# Patient Record
Sex: FEMALE | Race: WHITE | NOT HISPANIC OR LATINO | Employment: FULL TIME | URBAN - METROPOLITAN AREA
[De-identification: names, ages, dates, MRNs, and addresses within clinical notes are randomized per-mention and may not be internally consistent; named-entity substitution may affect disease eponyms.]

---
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
## 2022-03-28 ENCOUNTER — HOSPITAL ENCOUNTER (EMERGENCY)
Facility: MEDICAL CENTER | Age: 22
End: 2022-03-28
Attending: EMERGENCY MEDICINE
Payer: COMMERCIAL

## 2022-03-28 VITALS
HEIGHT: 60 IN | HEART RATE: 87 BPM | DIASTOLIC BLOOD PRESSURE: 75 MMHG | OXYGEN SATURATION: 100 % | SYSTOLIC BLOOD PRESSURE: 110 MMHG | TEMPERATURE: 97 F | BODY MASS INDEX: 20.26 KG/M2 | WEIGHT: 103.17 LBS | RESPIRATION RATE: 16 BRPM

## 2022-03-28 DIAGNOSIS — R10.13 ABDOMINAL PAIN, ACUTE, EPIGASTRIC: ICD-10-CM

## 2022-03-28 DIAGNOSIS — E86.0 DEHYDRATION: ICD-10-CM

## 2022-03-28 DIAGNOSIS — R11.2 NAUSEA AND VOMITING IN ADULT: ICD-10-CM

## 2022-03-28 LAB
ALBUMIN SERPL BCP-MCNC: 4.6 G/DL (ref 3.2–4.9)
ALBUMIN/GLOB SERPL: 1.4 G/DL
ALP SERPL-CCNC: 72 U/L (ref 30–99)
ALT SERPL-CCNC: 12 U/L (ref 2–50)
ANION GAP SERPL CALC-SCNC: 15 MMOL/L (ref 7–16)
AST SERPL-CCNC: 19 U/L (ref 12–45)
B-OH-BUTYR SERPL-MCNC: 0.52 MMOL/L (ref 0.02–0.27)
BASE EXCESS BLDV CALC-SCNC: -5 MMOL/L
BASOPHILS # BLD AUTO: 0.4 % (ref 0–1.8)
BASOPHILS # BLD: 0.09 K/UL (ref 0–0.12)
BILIRUB SERPL-MCNC: 0.6 MG/DL (ref 0.1–1.5)
BODY TEMPERATURE: 37 CENTIGRADE
BUN SERPL-MCNC: 11 MG/DL (ref 8–22)
CALCIUM SERPL-MCNC: 9.3 MG/DL (ref 8.4–10.2)
CHLORIDE SERPL-SCNC: 102 MMOL/L (ref 96–112)
CO2 SERPL-SCNC: 20 MMOL/L (ref 20–33)
CREAT SERPL-MCNC: 0.63 MG/DL (ref 0.5–1.4)
EOSINOPHIL # BLD AUTO: 0.06 K/UL (ref 0–0.51)
EOSINOPHIL NFR BLD: 0.3 % (ref 0–6.9)
ERYTHROCYTE [DISTWIDTH] IN BLOOD BY AUTOMATED COUNT: 39.6 FL (ref 35.9–50)
GFR SERPLBLD CREATININE-BSD FMLA CKD-EPI: 130 ML/MIN/1.73 M 2
GLOBULIN SER CALC-MCNC: 3.2 G/DL (ref 1.9–3.5)
GLUCOSE BLD STRIP.AUTO-MCNC: 156 MG/DL (ref 65–99)
GLUCOSE SERPL-MCNC: 172 MG/DL (ref 65–99)
HCG SERPL QL: NEGATIVE
HCO3 BLDV-SCNC: 21 MMOL/L (ref 24–28)
HCT VFR BLD AUTO: 40.7 % (ref 37–47)
HGB BLD-MCNC: 13.7 G/DL (ref 12–16)
IMM GRANULOCYTES # BLD AUTO: 0.14 K/UL (ref 0–0.11)
IMM GRANULOCYTES NFR BLD AUTO: 0.7 % (ref 0–0.9)
LACTATE BLD-SCNC: 2 MMOL/L (ref 0.5–2)
LIPASE SERPL-CCNC: 16 U/L (ref 7–58)
LYMPHOCYTES # BLD AUTO: 0.58 K/UL (ref 1–4.8)
LYMPHOCYTES NFR BLD: 2.9 % (ref 22–41)
MCH RBC QN AUTO: 30.6 PG (ref 27–33)
MCHC RBC AUTO-ENTMCNC: 33.7 G/DL (ref 33.6–35)
MCV RBC AUTO: 91.1 FL (ref 81.4–97.8)
MONOCYTES # BLD AUTO: 1.48 K/UL (ref 0–0.85)
MONOCYTES NFR BLD AUTO: 7.3 % (ref 0–13.4)
NEUTROPHILS # BLD AUTO: 17.96 K/UL (ref 2–7.15)
NEUTROPHILS NFR BLD: 88.4 % (ref 44–72)
NRBC # BLD AUTO: 0 K/UL
NRBC BLD-RTO: 0 /100 WBC
PCO2 BLDV: 41.9 MMHG (ref 41–51)
PH BLDV: 7.32 [PH] (ref 7.31–7.45)
PLATELET # BLD AUTO: 351 K/UL (ref 164–446)
PMV BLD AUTO: 9.4 FL (ref 9–12.9)
PO2 BLDV: 60.2 MMHG (ref 25–40)
POTASSIUM SERPL-SCNC: 3.4 MMOL/L (ref 3.6–5.5)
PROT SERPL-MCNC: 7.8 G/DL (ref 6–8.2)
RBC # BLD AUTO: 4.47 M/UL (ref 4.2–5.4)
SAO2 % BLDV: 88.2 %
SODIUM SERPL-SCNC: 137 MMOL/L (ref 135–145)
WBC # BLD AUTO: 20.3 K/UL (ref 4.8–10.8)

## 2022-03-28 PROCEDURE — 99285 EMERGENCY DEPT VISIT HI MDM: CPT

## 2022-03-28 PROCEDURE — 96375 TX/PRO/DX INJ NEW DRUG ADDON: CPT

## 2022-03-28 PROCEDURE — 82803 BLOOD GASES ANY COMBINATION: CPT

## 2022-03-28 PROCEDURE — 85025 COMPLETE CBC W/AUTO DIFF WBC: CPT

## 2022-03-28 PROCEDURE — 84703 CHORIONIC GONADOTROPIN ASSAY: CPT

## 2022-03-28 PROCEDURE — 700111 HCHG RX REV CODE 636 W/ 250 OVERRIDE (IP): Performed by: EMERGENCY MEDICINE

## 2022-03-28 PROCEDURE — 83690 ASSAY OF LIPASE: CPT

## 2022-03-28 PROCEDURE — 83605 ASSAY OF LACTIC ACID: CPT

## 2022-03-28 PROCEDURE — 80053 COMPREHEN METABOLIC PANEL: CPT

## 2022-03-28 PROCEDURE — 82010 KETONE BODYS QUAN: CPT

## 2022-03-28 PROCEDURE — 96374 THER/PROPH/DIAG INJ IV PUSH: CPT

## 2022-03-28 PROCEDURE — 36415 COLL VENOUS BLD VENIPUNCTURE: CPT

## 2022-03-28 PROCEDURE — 82962 GLUCOSE BLOOD TEST: CPT

## 2022-03-28 PROCEDURE — 700105 HCHG RX REV CODE 258: Performed by: EMERGENCY MEDICINE

## 2022-03-28 RX ORDER — HALOPERIDOL 5 MG/ML
1 INJECTION INTRAMUSCULAR ONCE
Status: COMPLETED | OUTPATIENT
Start: 2022-03-28 | End: 2022-03-28

## 2022-03-28 RX ORDER — SODIUM CHLORIDE, SODIUM LACTATE, POTASSIUM CHLORIDE, CALCIUM CHLORIDE 600; 310; 30; 20 MG/100ML; MG/100ML; MG/100ML; MG/100ML
1000 INJECTION, SOLUTION INTRAVENOUS ONCE
Status: COMPLETED | OUTPATIENT
Start: 2022-03-28 | End: 2022-03-28

## 2022-03-28 RX ORDER — PROCHLORPERAZINE MALEATE 10 MG
10 TABLET ORAL EVERY 6 HOURS PRN
Qty: 10 TABLET | Refills: 3 | Status: SHIPPED | OUTPATIENT
Start: 2022-03-28 | End: 2022-04-22

## 2022-03-28 RX ORDER — DIPHENHYDRAMINE HYDROCHLORIDE 50 MG/ML
25 INJECTION INTRAMUSCULAR; INTRAVENOUS ONCE
Status: COMPLETED | OUTPATIENT
Start: 2022-03-28 | End: 2022-03-28

## 2022-03-28 RX ADMIN — DIPHENHYDRAMINE HYDROCHLORIDE 25 MG: 50 INJECTION INTRAMUSCULAR; INTRAVENOUS at 05:40

## 2022-03-28 RX ADMIN — SODIUM CHLORIDE, POTASSIUM CHLORIDE, SODIUM LACTATE AND CALCIUM CHLORIDE 1000 ML: 600; 310; 30; 20 INJECTION, SOLUTION INTRAVENOUS at 05:41

## 2022-03-28 RX ADMIN — HALOPERIDOL LACTATE 1 MG: 5 INJECTION, SOLUTION INTRAMUSCULAR at 05:40

## 2022-03-28 NOTE — ED NOTES
Patient is stable for discharge at this time, anticipatory guidance provided, close follow-up is encouraged, and ED return instructions have been detailed.

## 2022-03-28 NOTE — ED PROVIDER NOTES
ED Provider Note    ED Provider Note    Scribed for Giselle Infante MD by Giselle Infante M.D.. 3/28/2022, 5:31 AM.    Primary care provider: Valentina Penaloza M.D.  Means of arrival: Private  History obtained from: Patient  History limited by: None    CHIEF COMPLAINT  Chief Complaint   Patient presents with   • N/V       HPI  Roxy Encarnacion is a 21 y.o. female who presents to the Emergency Department for evaluation of epigastric abdominal pain as well as nausea and vomiting.  Patient notes this started last night.  She has a history of monogenic diabetes.  Notes ill contacts with similar symptoms though there is has resolved and she is continued vomiting every few minutes throughout the night.  Patient otherwise not really had any similar such symptoms.  No diarrhea, no fever but endorses chills.  Pain is sharp and severe localized to the epigastrium without radiation elsewhere.  Patient relates she vapes THC on a daily basis, no significant alcohol use.  No lower abdominal/pelvic pain.  She took oral Zofran at home with no improvement    REVIEW OF SYSTEMS  Pertinent positives include acute epigastric abdominal pain with repeated episodes of nausea and vomiting, associated chills. Pertinent negatives include no pelvic pain, no fever, no hematemesis, no diarrhea.  All other systems reviewed and negative.    PAST MEDICAL HISTORY   has a past medical history of MRSA (methicillin resistant Staphylococcus aureus) and Sinusitis acute (4/7/2010).    SURGICAL HISTORY   has a past surgical history that includes tumor excision with biopsy (left index).    SOCIAL HISTORY  Social History     Tobacco Use   • Smoking status: Never Smoker   • Smokeless tobacco: Never Used   Vaping Use   • Vaping Use: Every day   • Substances: Nicotine, THC   Substance Use Topics   • Alcohol use: No   • Drug use: Yes     Comment: marijuana      Social History     Substance and Sexual Activity   Drug Use Yes    Comment: marijuana        FAMILY HISTORY  Family History   Problem Relation Age of Onset   • Alcohol/Drug Mother    Mature onset diabetes of the young    CURRENT MEDICATIONS  Home Medications    **Home medications have not yet been reviewed for this encounter**         ALLERGIES  Allergies   Allergen Reactions   • Nkda [No Known Drug Allergy]        PHYSICAL EXAM  VITAL SIGNS: /74   Pulse 78   Temp (!) 35.7 °C (96.3 °F) (Temporal)   Resp 18   Ht 1.524 m (5')   Wt 46.8 kg (103 lb 2.8 oz)   LMP  (Approximate)   SpO2 99%   Breastfeeding No   BMI 20.15 kg/m²     General: Alert, mild acute distress  Skin: Warm, dry, pale, otherwise normal for ethnicity  Head: Normocephalic, atraumatic  Neck: Trachea midline, no tenderness  Eye: Sclera anicteric, normal conjunctiva  ENMT: Oral mucosa pink and dry, no pharyngeal erythema or exudate  Cardiovascular: S1, S2, mildly tachycardic, otherwise regular rate and rhythm, No murmur, Normal peripheral perfusion  Respiratory: Lungs CTA, respirations are non-labored, breath sounds are equal  Gastrointestinal: Soft, no lower quadrant tenderness, tenderness localized the epigastrium.  No guarding, no rebound, no rigidity.  Bowel sounds hypoactive, abdomen nondistended.  Musculoskeletal: No swelling, no deformity  Neurological: Alert and oriented to person, place, time, and situation  Psychiatric: Cooperative, appropriate mood & affect      DIAGNOSTIC STUDIES/PROCEDURES    LABS  Results for orders placed or performed during the hospital encounter of 03/28/22   CBC WITH DIFFERENTIAL   Result Value Ref Range    WBC 20.3 (H) 4.8 - 10.8 K/uL    RBC 4.47 4.20 - 5.40 M/uL    Hemoglobin 13.7 12.0 - 16.0 g/dL    Hematocrit 40.7 37.0 - 47.0 %    MCV 91.1 81.4 - 97.8 fL    MCH 30.6 27.0 - 33.0 pg    MCHC 33.7 33.6 - 35.0 g/dL    RDW 39.6 35.9 - 50.0 fL    Platelet Count 351 164 - 446 K/uL    MPV 9.4 9.0 - 12.9 fL    Neutrophils-Polys 88.40 (H) 44.00 - 72.00 %    Lymphocytes 2.90 (L) 22.00 - 41.00 %     Monocytes 7.30 0.00 - 13.40 %    Eosinophils 0.30 0.00 - 6.90 %    Basophils 0.40 0.00 - 1.80 %    Immature Granulocytes 0.70 0.00 - 0.90 %    Nucleated RBC 0.00 /100 WBC    Neutrophils (Absolute) 17.96 (H) 2.00 - 7.15 K/uL    Lymphs (Absolute) 0.58 (L) 1.00 - 4.80 K/uL    Monos (Absolute) 1.48 (H) 0.00 - 0.85 K/uL    Eos (Absolute) 0.06 0.00 - 0.51 K/uL    Baso (Absolute) 0.09 0.00 - 0.12 K/uL    Immature Granulocytes (abs) 0.14 (H) 0.00 - 0.11 K/uL    NRBC (Absolute) 0.00 K/uL   COMP METABOLIC PANEL   Result Value Ref Range    Sodium 137 135 - 145 mmol/L    Potassium 3.4 (L) 3.6 - 5.5 mmol/L    Chloride 102 96 - 112 mmol/L    Co2 20 20 - 33 mmol/L    Anion Gap 15.0 7.0 - 16.0    Glucose 172 (H) 65 - 99 mg/dL    Bun 11 8 - 22 mg/dL    Creatinine 0.63 0.50 - 1.40 mg/dL    Calcium 9.3 8.4 - 10.2 mg/dL    AST(SGOT) 19 12 - 45 U/L    ALT(SGPT) 12 2 - 50 U/L    Alkaline Phosphatase 72 30 - 99 U/L    Total Bilirubin 0.6 0.1 - 1.5 mg/dL    Albumin 4.6 3.2 - 4.9 g/dL    Total Protein 7.8 6.0 - 8.2 g/dL    Globulin 3.2 1.9 - 3.5 g/dL    A-G Ratio 1.4 g/dL   LIPASE   Result Value Ref Range    Lipase 16 7 - 58 U/L   LACTIC ACID   Result Value Ref Range    Lactic Acid 2.0 0.5 - 2.0 mmol/L   HCG Qual Serum   Result Value Ref Range    Beta-Hcg Qualitative Serum Negative Negative   VENOUS BLOOD GAS   Result Value Ref Range    Venous Bg Ph 7.32 7.31 - 7.45    Venous Bg Pco2 41.9 41.0 - 51.0 mmHg    Venous Bg Po2 60.2 (H) 25.0 - 40.0 mmHg    Venous Bg O2 Saturation 88.2 %    Venous Bg Hco3 21 (L) 24 - 28 mmol/L    Venous Bg Base Excess -5 mmol/L    Body Temp 37.0 Centigrade   ESTIMATED GFR   Result Value Ref Range    GFR (CKD-EPI) 130 >60 mL/min/1.73 m 2   POCT glucose device results   Result Value Ref Range    POC Glucose, Blood 156 (H) 65 - 99 mg/dL     All labs reviewed by me, significant leukocytosis with neutrophil predominance.      COURSE & MEDICAL DECISION MAKING  Pertinent Labs & Imaging studies reviewed. (See chart for  details)    5:31 AM - Patient seen and examined at bedside. Patient will be treated with haloperidol 1 mg IV, diphenhydramine 25 mg IV, 1 L of crystalloid. Ordered blood work-up as well as venous blood gases and beta hydroxybutyrate to evaluate her symptoms. The differential diagnoses include but are not limited to: Diabetic ketoacidosis, cannabis hyperemesis syndrome, gastroenteritis, gastritis, pancreatitis, dehydration, electrolyte abnormality    0550: Blood gas is reassuring, low normal pH not in acidotic range.  Patient is only mildly hyperglycemic per Accu-Chek, 155.  Chemistries are still pending but this would make diabetic ketoacidosis far less likely.    0631: Patient reassessed, she is feeling much better, nausea and pain essentially resolved.  Her skin tone is improved with IV fluids.  I have updated her with thankfully unremarkable work-up other than evidence of leukocytosis.    Patient Vitals for the past 24 hrs:   BP Temp Temp src Pulse Resp SpO2 Height Weight   03/28/22 0616 115/74 -- -- 78 18 99 % -- --   03/28/22 0508 137/93 (!) 35.7 °C (96.3 °F) Temporal 100 18 96 % 1.524 m (5') 46.8 kg (103 lb 2.8 oz)       HYDRATION: Based on the patient's presentation of Acute Vomiting, Dehydration and Tachycardia the patient was given IV fluids. IV Hydration was used because oral hydration was not adequate alone. Upon recheck following hydration, the patient was Doing better, tachycardia resolved, heart rate 78.    Decision Making:      This is a 21 y.o. year old female who presents with upper abdominal pain with nausea and vomiting since last night.  Patient relates ill contacts at home with similar symptoms but hers have been quite persistent.  Patient notes she can only go a few minutes been vomiting and cannot tolerate any oral intake.  Clear indication for IV fluid resuscitation.  Patient retches during the history as well, vomitus is consistent with stomach acid and is nonbilious.  She is afebrile but is  tachycardic.  Given there are ill contacts around the home certainly viral gastroenteritis is high on the differential, in addition given history of monogenic diabetes I do think is reasonable to obtain metabolic work-up to evaluate for potential diabetic ketoacidosis.  In addition, patient does use daily THC, cannabis hyperemesis syndrome must be considered as well.  Given no better with Zofran I have ordered Haldol as well as diphenhydramine.        Work-up demonstrates leukocytosis but otherwise unremarkable studies.  Tadeo appendicitis score is 4, unlikely to represent acute appendicitis.  She is feeling markedly better without any narcotics, leukocytosis likely secondary to demargination and volume depletion; certainly viral gastroenteritis is a significant consideration as well.  Given no better with Zofran I will write for Compazine for home as needed.     The patient will return for new or worsening symptoms and is stable at the time of discharge.    Patient has had high blood pressure while in the emergency department, felt likely secondary to medical condition. Counseled patient to monitor blood pressure at home and follow up with primary care physician.     DISPOSITION:  Patient will be discharged home in stable condition.    FOLLOW UP:  Valentina Penaloza M.D.  [address] [place] [id number]  [phone]    Schedule an appointment as soon as possible for a visit in 1 day        OUTPATIENT MEDICATIONS:  New Prescriptions    PROCHLORPERAZINE (COMPAZINE) 10 MG TAB    Take 1 Tablet by mouth every 6 hours as needed for Nausea/Vomiting.         FINAL IMPRESSION  1. Dehydration    2. Nausea and vomiting in adult    3. Abdominal pain, acute, epigastric          IGiselle M.D. (Kristen), am scribing for, and in the presence of, Giselle Infante MD.    Electronically signed by: Giselle Infante M.D. (Kristen), 3/28/2022    IGiselle MD personally performed  the services described in this documentation, as scribed by Giselle Infante M.D. in my presence, and it is both accurate and complete    The note accurately reflects work and decisions made by me.  Giselle Infante M.D.  3/28/2022  5:51 AM

## 2022-04-21 ENCOUNTER — APPOINTMENT (OUTPATIENT)
Dept: MEDICAL GROUP | Age: 22
End: 2022-04-21
Payer: COMMERCIAL

## 2022-04-22 ENCOUNTER — OFFICE VISIT (OUTPATIENT)
Dept: MEDICAL GROUP | Age: 22
End: 2022-04-22
Payer: COMMERCIAL

## 2022-04-22 VITALS
SYSTOLIC BLOOD PRESSURE: 96 MMHG | HEIGHT: 60 IN | TEMPERATURE: 98.2 F | RESPIRATION RATE: 15 BRPM | HEART RATE: 100 BPM | BODY MASS INDEX: 20.42 KG/M2 | DIASTOLIC BLOOD PRESSURE: 60 MMHG | OXYGEN SATURATION: 97 % | WEIGHT: 104 LBS

## 2022-04-22 DIAGNOSIS — F41.9 ANXIETY: ICD-10-CM

## 2022-04-22 DIAGNOSIS — Z76.89 ESTABLISHING CARE WITH NEW DOCTOR, ENCOUNTER FOR: ICD-10-CM

## 2022-04-22 DIAGNOSIS — E13.00: ICD-10-CM

## 2022-04-22 DIAGNOSIS — R73.09 ELEVATED HEMOGLOBIN A1C: ICD-10-CM

## 2022-04-22 PROBLEM — R63.4 WEIGHT LOSS: Status: RESOLVED | Noted: 2018-11-20 | Resolved: 2022-04-22

## 2022-04-22 PROBLEM — R53.83 OTHER FATIGUE: Status: RESOLVED | Noted: 2018-11-20 | Resolved: 2022-04-22

## 2022-04-22 PROBLEM — R10.13 EPIGASTRIC PAIN: Status: RESOLVED | Noted: 2018-11-01 | Resolved: 2022-04-22

## 2022-04-22 PROBLEM — R42 DIZZINESS: Status: RESOLVED | Noted: 2020-05-28 | Resolved: 2022-04-22

## 2022-04-22 PROCEDURE — 99203 OFFICE O/P NEW LOW 30 MIN: CPT | Performed by: PHYSICIAN ASSISTANT

## 2022-04-22 RX ORDER — NORETHINDRONE ACETATE AND ETHINYL ESTRADIOL 1MG-20(21)
KIT ORAL
COMMUNITY
Start: 2022-04-18

## 2022-04-22 ASSESSMENT — FIBROSIS 4 INDEX: FIB4 SCORE: 0.31

## 2022-04-22 ASSESSMENT — PATIENT HEALTH QUESTIONNAIRE - PHQ9: CLINICAL INTERPRETATION OF PHQ2 SCORE: 0

## 2022-04-22 NOTE — LETTER
To whom it may concern     Roxy is my patient and is under my care. I am intimately aware of her medical history and functional restrictions brought by her mental condition. She meets the definition of disabled under the Americans with Disabilities Act, the Rehabilitation Act of 1973, and the Fair Housing Act.      As a result of mental illness,Roxy has certain limitations related to (anxiety, depression, social interaction, phobias, etc.). In order to assist in alleviating these difficulties, and to improve their ability to lead a better life while fully enjoying and using the dwelling unit you own and/or manage, I am prescribing an DMITRIY prescription letter that will help Roxy in dealing with his/her disability better.      I am very much aware of the voluminous professional literature related to the therapeutic benefits of emotional support animals for individuals with mental disabilities, such as that faced by Roxy. If you have any questions  regarding my recommendation that Roxy should have an emotional support animal please do not hesitate to get in touch with me.       Sincerely,     Hayley Crow P.A.-C.  Electronically Signed

## 2022-04-22 NOTE — PROGRESS NOTES
cc: Establish care, emotional support animal letter    Subjective:     HPI  Roxy Encarnacion is a 22 y.o. female presenting with her mom to establish [facility].  It appears that she has been seen by primary care provider.  She is going to school up in [place], she does come back for her medical care.  She would like to go into speech the pathology.    She has prior history of Deon type I.  She was previously seen by pediatric endocrinology, was never started on medications.  She believes last A1c-6.7, this was almost 2 years ago.  She does not check her blood sugars.  She is asymptomatic.  Denies polyuria, polydipsia, polyphagia, fatigue.    She also has mild anxiety.  She feels stable without medications.  She does have a cat, which  provides good emotional support.  She will be moving into a new apartment and will need an emotional support animal letter.  Denies depression, SI, HI.    Review of systems:  See above.       Current Outpatient Medications:   •  Blood Glucose Monitoring Suppl (TRUE METRIX METER) Device, 1 Device by Does not apply route 3 times a day., Disp: 1 Device, Rfl: 11  •  glucose blood strip, 1 Each by Other route as needed., Disp: , Rfl:   •  Ketone Blood Test (PRECISION XTRA) Strip, Check ketones prn BS >200 with vomiting., Disp: 10 Strip, Rfl: 11  •  NON SPECIFIED, Take 1 Tablet by mouth every day. Unknown BCP, Disp: , Rfl:   •  BLISOVI FE 1/20 1-20 MG-MCG per tablet, , Disp: , Rfl:     Allergies, past medical history, past surgical history, family history, social history reviewed and updated    Objective:     Vitals: BP (!) 96/60 (BP Location: Left arm, Patient Position: Sitting, BP Cuff Size: Adult)   Pulse 100   Temp 36.8 °C (98.2 °F) (Temporal)   Resp 15   Ht 1.524 m (5')   Wt 47.2 kg (104 lb)   SpO2 97%   BMI 20.31 kg/m²   General: Alert, pleasant, NAD  HEENT: Normocephalic. Neck supple.  No thyromegaly or masses palpated. No cervical or supraclavicular lymphadenopathy. No carotid bruits    Heart: Regular rate and rhythm.  S1 and S2 normal.  No murmurs appreciated.  Respiratory: Normal respiratory effort.  Clear to auscultation bilaterally.  Skin: Warm, dry, no rashes.  Extremities: No leg edema.  Radial pulses 2+ symmetric  Psych:  Affect/mood is normal, judgement is good, memory is intact, grooming is appropriate.    Assessment/Plan:     Roxy was seen today for establish care and paperwork.    Diagnoses and all orders for this visit:    TAWNY 1 with hyperosmolarity, controlled (HCC)  -Has been almost 2 years since she has had labs checked.  Also place referral to endocrinology for further evaluation if deemed appropriate.  -     Referral to Endocrinology    Elevated hemoglobin A1c  -     Comp Metabolic Panel; Future  -     HEMOGLOBIN A1C; Future    Anxiety  -Stable without medications.  Emotional support letter given    Establishing care with new doctor, encounter for  -We will request records and review when received      Return in about 3 months (around 7/22/2022) for Annual PX.

## 2022-04-26 ENCOUNTER — HOSPITAL ENCOUNTER (OUTPATIENT)
Dept: LAB | Facility: MEDICAL CENTER | Age: 22
End: 2022-04-26
Attending: PHYSICIAN ASSISTANT
Payer: COMMERCIAL

## 2022-04-26 DIAGNOSIS — R73.09 ELEVATED HEMOGLOBIN A1C: ICD-10-CM

## 2022-04-26 LAB
ALBUMIN SERPL BCP-MCNC: 4.9 G/DL (ref 3.2–4.9)
ALBUMIN/GLOB SERPL: 1.6 G/DL
ALP SERPL-CCNC: 75 U/L (ref 30–99)
ALT SERPL-CCNC: 13 U/L (ref 2–50)
ANION GAP SERPL CALC-SCNC: 16 MMOL/L (ref 7–16)
AST SERPL-CCNC: 16 U/L (ref 12–45)
BILIRUB SERPL-MCNC: 0.6 MG/DL (ref 0.1–1.5)
BUN SERPL-MCNC: 10 MG/DL (ref 8–22)
CALCIUM SERPL-MCNC: 9.8 MG/DL (ref 8.5–10.5)
CHLORIDE SERPL-SCNC: 100 MMOL/L (ref 96–112)
CO2 SERPL-SCNC: 24 MMOL/L (ref 20–33)
CREAT SERPL-MCNC: 0.73 MG/DL (ref 0.5–1.4)
EST. AVERAGE GLUCOSE BLD GHB EST-MCNC: 123 MG/DL
GFR SERPLBLD CREATININE-BSD FMLA CKD-EPI: 121 ML/MIN/1.73 M 2
GLOBULIN SER CALC-MCNC: 3.1 G/DL (ref 1.9–3.5)
GLUCOSE SERPL-MCNC: 113 MG/DL (ref 65–99)
HBA1C MFR BLD: 5.9 % (ref 4–5.6)
POTASSIUM SERPL-SCNC: 4.2 MMOL/L (ref 3.6–5.5)
PROT SERPL-MCNC: 8 G/DL (ref 6–8.2)
SODIUM SERPL-SCNC: 140 MMOL/L (ref 135–145)

## 2022-04-26 PROCEDURE — 80053 COMPREHEN METABOLIC PANEL: CPT

## 2022-04-26 PROCEDURE — 36415 COLL VENOUS BLD VENIPUNCTURE: CPT

## 2022-04-26 PROCEDURE — 83036 HEMOGLOBIN GLYCOSYLATED A1C: CPT

## 2022-06-09 ENCOUNTER — OFFICE VISIT (OUTPATIENT)
Dept: MEDICAL GROUP | Age: 22
End: 2022-06-09
Payer: COMMERCIAL

## 2022-06-09 VITALS
HEIGHT: 60 IN | SYSTOLIC BLOOD PRESSURE: 106 MMHG | HEART RATE: 100 BPM | RESPIRATION RATE: 16 BRPM | WEIGHT: 111.6 LBS | BODY MASS INDEX: 21.91 KG/M2 | OXYGEN SATURATION: 98 % | TEMPERATURE: 98.6 F | DIASTOLIC BLOOD PRESSURE: 62 MMHG

## 2022-06-09 DIAGNOSIS — Z00.00 WELL ADULT EXAM: ICD-10-CM

## 2022-06-09 DIAGNOSIS — E13.00: ICD-10-CM

## 2022-06-09 PROCEDURE — 99395 PREV VISIT EST AGE 18-39: CPT | Performed by: PHYSICIAN ASSISTANT

## 2022-06-09 ASSESSMENT — FIBROSIS 4 INDEX: FIB4 SCORE: 0.25

## 2022-06-09 NOTE — PROGRESS NOTES
Subjective:     CC:   Chief Complaint   Patient presents with   • Annual Exam       HPI:   Roxy Encarnacion is a 22 y.o. female who presents for annual exam    Patient has GYN provider: Yes  Last Pap Smear:n/a   Last Tdap: 2014  Received HPV series: Yes    Exercise: strenuous regular exercise, aerobic > 3 hours a week  Diet: well rounded      No LMP recorded.  Hx STDs: No  Birth control: ocp  Menses every month with 4 days with moderate bleeding.  Denies cramping.  No significant bloating/fluid retention, pelvic pain, or dyspareunia. No abnormal vaginal discharge.  No breast tenderness, mass, nipple discharge, changes in size or contour, or abnormal cyclic discomfort.     Latest Reference Range & Units 04/26/22 11:27   Sodium 135 - 145 mmol/L 140   Potassium 3.6 - 5.5 mmol/L 4.2   Chloride 96 - 112 mmol/L 100   Co2 20 - 33 mmol/L 24   Anion Gap 7.0 - 16.0  16.0   Glucose 65 - 99 mg/dL 113 (H)   Bun 8 - 22 mg/dL 10   Creatinine 0.50 - 1.40 mg/dL 0.73   GFR (CKD-EPI) >60 mL/min/1.73 m 2 121   Calcium 8.5 - 10.5 mg/dL 9.8   AST(SGOT) 12 - 45 U/L 16   ALT(SGPT) 2 - 50 U/L 13   Alkaline Phosphatase 30 - 99 U/L 75   Total Bilirubin 0.1 - 1.5 mg/dL 0.6   Albumin 3.2 - 4.9 g/dL 4.9   Total Protein 6.0 - 8.2 g/dL 8.0   Globulin 1.9 - 3.5 g/dL 3.1   A-G Ratio g/dL 1.6   Glycohemoglobin 4.0 - 5.6 % 5.9 (H)   Estim. Avg Glu mg/dL 123   (H): Data is abnormally high    OB History   No obstetric history on file.      She  has no history on file for sexual activity.    She  has a past medical history of MRSA (methicillin resistant Staphylococcus aureus) and Sinusitis acute (4/7/2010).  She  has a past surgical history that includes tumor excision with biopsy (left index) and shoulder arthroscopy w/ rotator cuff repair (Right, 2020).    Family History   Problem Relation Age of Onset   • Alcohol/Drug Mother    • Diabetes Mother    • No Known Problems Father    • Other Brother 8        SVT   • Diabetes Sister    • Anxiety disorder  Sister      Social History     Tobacco Use   • Smoking status: Never Smoker   • Smokeless tobacco: Never Used   Vaping Use   • Vaping Use: Every day   • Substances: Nicotine, THC   Substance Use Topics   • Alcohol use: No   • Drug use: Yes     Comment: marijuana       Patient Active Problem List    Diagnosis Date Noted   • TAWNY 1 with hyperosmolarity, controlled ([facility]) 04/22/2022   • Elevated hemoglobin A1c 11/20/2018   • Anxiety 11/05/2018     Current Outpatient Medications   Medication Sig Dispense Refill   • BLISOVI FE 1/20 1-20 MG-MCG per tablet      • Blood Glucose Monitoring Suppl (TRUE METRIX METER) Device 1 Device by Does not apply route 3 times a day. 1 Device 11   • glucose blood strip 1 Each by Other route as needed.     • Ketone Blood Test (PRECISION XTRA) Strip Check ketones prn BS >200 with vomiting. 10 Strip 11   • NON SPECIFIED Take 1 Tablet by mouth every day. Unknown BCP       No current facility-administered medications for this visit.     Allergies   Allergen Reactions   • Nkda [No Known Drug Allergy]        Review of Systems   Constitutional: Negative for fever, chills and malaise/fatigue.   HENT: Negative for congestion.    Eyes: Negative for pain.   Respiratory: Negative for cough and shortness of breath.    Cardiovascular: Negative for chest pain and leg swelling.   Gastrointestinal: Negative for nausea, vomiting, abdominal pain and diarrhea.   Genitourinary: Negative for dysuria and hematuria.   Skin: Negative for rash.   Neurological: Negative for dizziness, focal weakness and headaches.   Endo/Heme/Allergies: Does not bruise/bleed easily.   Psychiatric/Behavioral: Negative for depression.  The patient is not nervous/anxious.      Objective:   /62 (BP Location: Right arm, Patient Position: Sitting, BP Cuff Size: Adult)   Pulse 100   Temp 37 °C (98.6 °F) (Temporal)   Resp 16   Ht 1.524 m (5')   Wt 50.6 kg (111 lb 9.6 oz)   SpO2 98%   BMI 21.80 kg/m²     Wt Readings from Last 4  Encounters:   06/09/22 50.6 kg (111 lb 9.6 oz)   04/22/22 47.2 kg (104 lb) (7 %, Z= -1.49)*   03/28/22 46.8 kg (103 lb 2.8 oz) (6 %, Z= -1.55)*   12/01/20 43.1 kg (95 lb) (1 %, Z= -2.22)*     * Growth percentiles are based on [facility] (Girls, 2-20 Years) data.         Physical Exam:  Constitutional: Well-developed and well-nourished. Not diaphoretic. No distress.   Skin: Skin is warm and dry. No rash noted.  Head: Atraumatic without lesions.  Eyes: Conjunctivae and extraocular motions are normal. Pupils are equal, round, and reactive to light. No scleral icterus.   Ears:  External ears unremarkable. Tympanic membranes clear and intact.  Nose: Nares patent. Septum midline. Turbinates without erythema nor edema. No discharge.   Mouth/Throat: Tongue normal. Oropharynx is clear and moist. Posterior pharynx without erythema or exudates.  Neck: Supple, trachea midline. Normal range of motion. No thyromegaly present. No lymphadenopathy--cervical or supraclavicular.  Cardiovascular: Regular rate and rhythm, S1 and S2 without murmur, rubs, or gallops.    Respiratory: Effort normal. Clear to auscultation throughout. No adventitious sounds.   Abdomen: Soft, non tender, and without distention. Active bowel sounds in all four quadrants. No rebound, guarding, masses or HSM.  Extremities: No cyanosis, clubbing, erythema, nor edema. Distal pulses intact and symmetric.   Musculoskeletal: All major joints AROM full in all directions without pain.  Neurological: Alert and oriented x 3. Grossly non-focal. Strength and sensation grossly intact. DTRs 2+/3 and symmetric.   Psychiatric:  Behavior, mood, and affect are appropriate.    Assessment and Plan:     1. Well adult exam  Continue with regular physical activity and good control diet.  Follow-up with gynecology for regular screenings when she turns 23    2. TAWNY 1 with hyperosmolarity, controlled (HCC)  -A1c 5.9.  She has been referred to endocrine for further management if deemed  appropriate.  Advised to have labs redrawn in 6 months before her  endocrinology appointment.  - Comp Metabolic Panel; Future  - HEMOGLOBIN A1C; Future      Health maintenance:    Labs per orders  Immunizations per orders  Patient counseled about skin care, diet, supplements, and exercise.  Discussed  breast self exam, use and side effects of OCP's, diet and exercise     Follow-up: Return in about 1 year (around 6/9/2023) for Annual PX.

## 2023-12-18 NOTE — PROGRESS NOTES
Patient was presented for a telehealth consultation via secure and encrypted videoconferencing technology using [employer].  Parent/guardian and the patient consented to telemedicine.    Subjective:     HPI:     Roxy Encarnacion is a 20 y.o. female here today for follow up of presumed TAWNY, her sister reportedly has a mutation diagnosed in [place], [place].  We have been unable to get her insurance to cover the cost of TAWNY testing. Patient reports she was diagnosed with a bicornuate uterus and is followed by gynecology.       New since last visit: She has a torn rotator cuff and will need surgery.  Home on quarantine due to pandemic.  She is living with her grandfather but plans to move to [place] with her sister and grandma.     Patient was referred to our office by Dr. Penaloza.  Patient reports she became symptomatic in July of this year with abdominal pain, dizziness, nausea, fatigue, weakness, shaking.  She states due to ongoing issues with diarrhea and abdominal pain and she was referred to Dr. Butts.  She underwent an EGD in January 2019 that was normal.  Dr. Butts has ordered an MRI enterography study.  She does occasionally use marijuana.  She was counseled against using marijuana due to hyperemesis seen in some patients during its use.  She was also seen by her PCP for her abdominal pain.  She feels her abdominal pain has improved since she started eating the right foods.  Sometimes her ketones are high when her abdomen hurts.       She states that more recently she has developed polyuria, polydipsia and a 12 pound weight loss since over the past year.  Therefore, labs were done which showed an elevation of her hemoglobin A1c =6.7%.  Her CO2 was normal and her blood sugar is 108.     There is also family history of insulin-dependent diabetes on the mother's side.  Mother reportedly had gestational diabetes requiring with all 3 pregnancies.  However, when not pregnant she is not on any oral meds or  "insulin for her diabetes.  There is also reports of a maternal grandmother who is  secondary to diabetes, type unknown.  There are maternal relatives that have \"TAWNY\" and only take pills when their blood sugars are elevated.       She c/o of early satiety.  This only happens during the school year.  She can eat fine over breaks and summer.  She has a lot of anxiety about going to school.  It is more the social side of school that makes her anxious. This did not happen during the summer.  She is working 30 hour and going to school and playing a sport.  She feels the OCPs cause her to have abdominal pain.  She reports gyn is switching her to DepoProvera.    Review of: meter was not done, it is not working. Her meter has not been working.  She has not been checking her ketones.  There were a few times when she was in the shower and felt like she was going to pass out.  She had to get out of the shower and lie down.  She felt better after eating.  It can occur with or with eating prior to showering.  She gets dizzy when going from lying to standing.  No syncope.  She has not checked her blood sugars when dizzy.  Her weight is around 95 pounds.  She still has c/o abdominal pain.  She has not checked ketones with c/o abdominal pain.  She has ketones test strips at home.  She has not seen Dr Butts recently.  She suffers from significant constipation.  OCP were causing nausea and abd pain, she switched to Depo with some improvement.      She has not reached out to [facility] regarding testing for TAWNY.         10/22/2019 08:18   Glycohemoglobin 5.9 (A)      2020 10:00   Sodium 140   Potassium 4.0   Chloride 106   Co2 23   Anion Gap 11.0   Glucose 118 (H)   Bun 10   Creatinine 0.67   GFR If  >60   GFR If Non African American >60   Calcium 9.1   Fasting Status Fasting       ROS   + fatigue  + headaches since getting new contacts.  + numbness/tingling in hands and feet but has " "improved.  No abdominal pain, nausea, vomiting, constipation or diarrhea.   No fever  No cough  No shortness of breath.   No nocturia, polyuria, polydipsia  No sleep disturbance    Allergies   Allergen Reactions   • Nkda [No Known Drug Allergy]        Current medicines (including changes today)  Current Outpatient Medications   Medication Sig Dispense Refill   • medroxyPROGESTERone (DEPO-PROVERA) 150 MG/ML Suspension 150 mg by Intramuscular route Once.     • acetone, urine, test (KETOSTIX) strip Test as needed with nausea or vomiting 100 Strip 11   • ondansetron (ZOFRAN ODT) 4 MG TABLET DISPERSIBLE Take 1 Tab by mouth every 8 hours as needed. 8 Tab 0   • Blood Glucose Monitoring Suppl (TRUE METRIX METER) Device 1 Device by Does not apply route 3 times a day. (Patient not taking: Reported on 5/28/2020) 1 Device 11   • glucose blood (TRUE METRIX BLOOD GLUCOSE TEST) strip 1 Each by Other route as needed.     • Ketone Blood Test (PRECISION XTRA) Strip Check ketones prn BS >200 with vomiting. (Patient not taking: Reported on 9/27/2019) 10 Strip 11   • NON SPECIFIED Take 1 Tab by mouth every day. Unknown BCP       No current facility-administered medications for this visit.        Patient Active Problem List    Diagnosis Date Noted   • Dizziness 05/28/2020   • Elevated hemoglobin A1c 11/20/2018   • Weight loss 11/20/2018   • Other fatigue 11/20/2018   • Anxiety 11/05/2018   • Epigastric pain 11/01/2018   • Sinusitis, acute 04/07/2010       Past Medical History: 11/2018 elevated hemoglobin A1c =6.7% with weight loss, polyuria and polydipsia.  History of \"bone tumor\" (benign) requiring reconstructive surgery x3.  History of MRSA in groin status post I&D. bicornuate uterus     Family History: Mom with gestational diabetes requiring insulin during pregnancy only.  Maternal grandmother with diabetes.  Younger sister with a blood sugar = 130 mg/dl on routine labs.     Social History: Mom with addiction issues.  Father not " Anesthesia Volume In Cc: 0.5 really involved.  Had lived with paternal grandfather for a while.       Surgical History: Status post reconstructive finger surgery x3.  Status post I&D of      Objective:     Wt 44 kg (97 lb)     Physical Exam:  Constitutional: Well-developed and well-nourished.  No distress.   Head: Atraumatic without lesions.  Chest: Effort normal.   Extremities: POLO  Neurological: Alert and talkative  Psychiatric:  Behavior, mood, and affect are appropriate.       Assessment and Plan:   The following treatment plan was discussed:     1. Elevated hemoglobin A1c  We discussed the importance of testing blood sugars whenever symptomatic with complaints of abdominal pain, dizziness, numbness and tingling, etc. she should be in contact with our office if her blood sugars are out of range.  Additionally, I would also like to see her check for ketones if symptomatic and notify the office if urine ketones are moderate or large.  She also has a blood ketone meter at home as well.    It sounds like her weight is relatively stable.  I would like to obtain TAWNY testing.  Family has not stayed in contact with [facility] to facilitate this testing.  Patient reports she is moving to [facility] soon.  This is where her sister was able to receive the needed testing.  I have recommended that she do the same.    2. Epigastric pain  Continues to have complaints of abdominal pain.  From an endocrine standpoint, I would like her to rule out hyperglycemia or ketosis as the etiology.  - REFERRAL TO GASTROENTEROLOGY    3. Dizziness  I would like to see her increase her sodium and water intake to see if this improves her symptoms.  Additionally, she was instructed to check blood sugars and ketones when symptomatic.  She should notify the office if these are abnormal.    PLEASE NOTE: This dictation was created using voice recognition software. I have made every reasonable attempt to correct obvious errors, but I expect that there are  Post-Care Instructions: I reviewed with the patient in detail post-care instructions. Patient is to wear sunprotection, and avoid picking at any of the treated lesions. Pt may apply Vaseline to crusted or scabbing areas. errors of grammar and possibly content that I did not discover before finalizing the note.     The total time spent seeing the patient in consultation, and formulating an action plan for this visit was 15 minutes.       -Any change or worsening of signs or symptoms, patient encouraged to follow-up or report to emergency room for further evaluation. Patient verbalizes understanding and agrees.    Followup: Return in about 6 months (around 11/28/2020).            Render Post-Care Instructions In Note?: no Treatment Number (Will Not Render If 0): 0 Consent: The patient's consent was obtained including but not limited to risks of crusting, scabbing, blistering, scarring, darker or lighter pigmentary change, recurrence, incomplete removal and infection. Detail Level: Detailed Medical Necessity Information: It is in your best interest to select a reason for this procedure from the list below. All of these items fulfill various CMS LCD requirements except the new and changing color options. Medical Necessity Clause: This procedure was medically necessary because the lesions that were treated were: